# Patient Record
Sex: FEMALE | Race: WHITE | NOT HISPANIC OR LATINO | URBAN - NONMETROPOLITAN AREA
[De-identification: names, ages, dates, MRNs, and addresses within clinical notes are randomized per-mention and may not be internally consistent; named-entity substitution may affect disease eponyms.]

---

## 2017-03-10 ENCOUNTER — OFFICE VISIT (OUTPATIENT)
Dept: FAMILY MEDICINE CLINIC | Facility: CLINIC | Age: 52
End: 2017-03-10

## 2017-03-10 VITALS
DIASTOLIC BLOOD PRESSURE: 80 MMHG | HEIGHT: 62 IN | HEART RATE: 119 BPM | BODY MASS INDEX: 30.18 KG/M2 | SYSTOLIC BLOOD PRESSURE: 134 MMHG | OXYGEN SATURATION: 96 % | WEIGHT: 164 LBS | TEMPERATURE: 98.3 F | RESPIRATION RATE: 20 BRPM

## 2017-03-10 DIAGNOSIS — M79.10 MYALGIA: ICD-10-CM

## 2017-03-10 DIAGNOSIS — J02.8 PHARYNGITIS DUE TO OTHER ORGANISM: Primary | ICD-10-CM

## 2017-03-10 DIAGNOSIS — R05.9 COUGH: ICD-10-CM

## 2017-03-10 PROBLEM — R00.2 HEART PALPITATIONS: Status: ACTIVE | Noted: 2017-03-10

## 2017-03-10 PROBLEM — G43.909 MIGRAINES: Status: ACTIVE | Noted: 2017-03-10

## 2017-03-10 PROBLEM — I10 HYPERTENSION: Status: ACTIVE | Noted: 2017-03-10

## 2017-03-10 PROBLEM — E03.9 HYPOTHYROIDISM: Status: ACTIVE | Noted: 2017-03-10

## 2017-03-10 PROBLEM — K21.9 GASTROESOPHAGEAL REFLUX DISEASE: Status: ACTIVE | Noted: 2017-03-10

## 2017-03-10 PROBLEM — F41.9 ANXIETY: Status: ACTIVE | Noted: 2017-03-10

## 2017-03-10 LAB
EXPIRATION DATE: NORMAL
EXPIRATION DATE: NORMAL
FLUAV AG NPH QL: NORMAL
FLUBV AG NPH QL: NORMAL
INTERNAL CONTROL: NORMAL
INTERNAL CONTROL: NORMAL
Lab: NORMAL
Lab: NORMAL
S PYO AG THROAT QL: NEGATIVE

## 2017-03-10 PROCEDURE — 87804 INFLUENZA ASSAY W/OPTIC: CPT | Performed by: NURSE PRACTITIONER

## 2017-03-10 PROCEDURE — 87880 STREP A ASSAY W/OPTIC: CPT | Performed by: NURSE PRACTITIONER

## 2017-03-10 PROCEDURE — 99213 OFFICE O/P EST LOW 20 MIN: CPT | Performed by: NURSE PRACTITIONER

## 2017-03-10 RX ORDER — PREDNISONE 20 MG/1
20 TABLET ORAL DAILY
Qty: 5 TABLET | Refills: 0 | Status: SHIPPED | OUTPATIENT
Start: 2017-03-10 | End: 2017-03-15

## 2017-03-10 RX ORDER — FLUTICASONE PROPIONATE 50 MCG
1 SPRAY, SUSPENSION (ML) NASAL DAILY
COMMUNITY

## 2017-03-10 RX ORDER — DEXTROMETHORPHAN HYDROBROMIDE AND PROMETHAZINE HYDROCHLORIDE 15; 6.25 MG/5ML; MG/5ML
5 SYRUP ORAL 4 TIMES DAILY PRN
Qty: 120 ML | Refills: 0 | Status: SHIPPED | OUTPATIENT
Start: 2017-03-10

## 2017-03-10 RX ORDER — LEVOTHYROXINE SODIUM 0.05 MG/1
50 TABLET ORAL DAILY
COMMUNITY

## 2017-03-10 NOTE — PROGRESS NOTES
Chief Complaint   Patient presents with   • Sore Throat     skin hurts   • Cough     low grade fever last nite and this morn   • Earache     left ear        Allergies   Allergen Reactions   • Bactrim [Sulfamethoxazole-Trimethoprim]    • Codeine    • Prevacid [Lansoprazole]        History provided by: self     HPI:  Subjective   Radha Sethi is a 51 y.o. female presents today with Complaints of myalgia, low grade fever, cough, sore throat and congestion for 2 days with no improvement with otc meds.  Has had associated low grade fever, left ear pain.  Rates overall 9/10.  Has anxiety stable with alprazolam, GERD stable with famotidine, hypothyroidism stable with levothyroxine, HTN stable with lisinopril and HCTZ, and takes primrose for hot flashes.  Rates overall 9/10     PCP currently listed as Louise Hurtado, DNP, APRN.     Aggravating Factors: laying flat, constant clearing throat  Improving Factors: popsicles  Treatments Tried: ibuprofen  Sick Contacts none    Past Medical History   Diagnosis Date   • Anxiety    • Gastroesophageal reflux disease    • Heart palpitations    • Hypertension    • Hypothyroidism    • Migraines      Past Surgical History   Procedure Laterality Date   • Total laparoscopic hysterectomy       with bilateral salpingectomy, cysto Davinci Assisted   •  section     • Cholecystectomy     • Appendectomy       Social History     Social History   • Marital status:      Spouse name: N/A   • Number of children: N/A   • Years of education: N/A     Social History Main Topics   • Smoking status: Former Smoker     Types: Cigarettes   • Smokeless tobacco: Never Used   • Alcohol use No   • Drug use: None   • Sexual activity: Not Asked     Other Topics Concern   • None     Social History Narrative     Family History   Problem Relation Age of Onset   • Cerebral aneurysm Mother    • Hypertension Father    • Breast cancer Sister    • Breast cancer Maternal Grandmother    • Ovarian cancer  "Paternal Grandmother    • No Known Problems Sister    • No Known Problems Sister    • Breast cancer Maternal Aunt        Current Outpatient Prescriptions on File Prior to Visit   Medication Sig Dispense Refill   • ALPRAZolam (XANAX) 0.5 MG tablet Take 0.5 mg by mouth at night as needed for anxiety.     • famotidine (PEPCID) 20 MG tablet Take 20 mg by mouth 2 (two) times a day.     • hydrochlorothiazide (MICROZIDE) 12.5 MG capsule Take 12.5 mg by mouth every morning.     • lisinopril (PRINIVIL,ZESTRIL) 10 MG tablet Take 10 mg by mouth daily.     • psyllium (METAMUCIL) 58.6 % packet Take 1 packet by mouth daily.       No current facility-administered medications on file prior to visit.       ROS Positives bolded  Constitutional: Negative for appetite change.  Has myalgia, fever  HENT: Negative for ear pain, rhinorrhea and sinus pressure.  sore throat  Respiratory: Negative for chest tightness and shortness of breath. has cough   Cardiology:  Negative for Chest pain, numbness or tingling   Gastrointestinal: Negative for diarrhea.   Genitourinary: Negative for dysuria, flank pain and urgency.   \"All other systems reviewed and negative, except as listed above.”      OBJECTIVE:  General appearance: alert, appears stated age and cooperative    Head: Normocephalic, without obvious abnormality, atraumatic    Eyes: conjunctivae/corneas clear. PERRL, EOM's intact.    Ears: normal TM's and external ear canals both ears    Nose: Nares normal. Septum midline. Mucosa normal. Clear drainage or sinus tenderness.    Throat: abnormal findings: pharyngeal Erythema, no tonsillar exudate.  Tonsils normal.    Neck: mild anterior cervical adenopathy, supple, symmetrical, trachea midline and thyroid not enlarged, symmetric, no tenderness/mass/nodules    Lungs: clear to auscultation bilaterally    Abdomen:  Soft, non tender, non distended. Bowel sounds present all quadrants.  No rebound, no guarding, no hepatosplenomegaly in supine or " "decubitus positions.    Heart: regular rate and rhythm, S1, S2 normal, no murmur, click, rub or gallop    Abdomen: soft, non-tender; bowel sounds normal; no masses,  no organomegaly    Extremities: extremities normal, atraumatic, no cyanosis or edema    Skin: Skin color, texture, turgor normal. No rashes or lesions.  Scarlatiniform Rash: no    Lymph nodes: supraclavicular, and axillary nodes normal. Anterior cervical LN enlarged along zone 2 with tenderness. Nodes are <2cm in size.  Anterior chain without deeper cervical chain involvement.     Neurologic: Alert and oriented X 3, normal strength and tone. Normal coordination and gait. No obvious cranial nerve defects.     Visit Vitals   • /80   • Pulse 119   • Temp 98.3 °F (36.8 °C)   • Resp 20   • Ht 62\" (157.5 cm)   • Wt 164 lb (74.4 kg)   • LMP  (LMP Unknown)   • SpO2 96%   • BMI 30 kg/m2       Assessment/Plan  Radha was seen today for sore throat, cough and earache.    Diagnoses and all orders for this visit:    Pharyngitis due to other organism  -     POC Rapid Strep A  -     predniSONE (DELTASONE) 20 MG tablet; Take 1 tablet by mouth Daily for 5 days.    Myalgia  -     POC Influenza A / B    Cough  -     promethazine-dextromethorphan (PROMETHAZINE-DM) 6.25-15 MG/5ML syrup; Take 5 mL by mouth 4 (Four) Times a Day As Needed for cough.  -     predniSONE (DELTASONE) 20 MG tablet; Take 1 tablet by mouth Daily for 5 days.        Definition  Pharyngitis: Inflammation of the pharynx and surrounding lymph tissue (tonsils)    Ddx: Stomatitis, Rhinitis or sinusitis, post nasal drip, epiglottis, thyroiditis, Strep, URI, mononucleosis, herpangina, Kawasaki disease, rubella, rubeola, peritonsilar abscess    LABS:  POCT Rapid Strep  Negative     POCT Rapid Flu A/B  Negative      Pt Education  1. Call office immediately pt has: dyspnea, drooling, diffiuclty swallowing and inability to       fully open mouth  2. Increase fluid intake  3. Can not return to school or work " until on antibiotic therapy for 24 hours  4. Change toothbrush in 24 hrs or clean with baking soda and peroxide  5. Good handwashing  6. Salt water gargles 3-4 times a day  7. Offered handout to pt regarding dx.   8.  Allergy recommendations discussed  9.  Would change pillowcases and wash with hot/dry hot 2x weekly and change sheets        minimum weekly. Consider anti-allergenic coverings for sheets/pillowcases.   10. Avoid tobacco  11. Keep pets out of room of sleeping as able.    12. Risks/benefits of abx and steroids discussed with patient today.  13. Allergies reviewed.  The patient/family are aware and accept that if there any side         effects they should call or return to clinic as soon as possible.  14. Risks/benefits of current and new medication discussed  15. Injection: Decadron, dexamethasone, methylprednisolone, prednisone. Pt notified of         potential pros/risks of steroid treatment including rapid improvement of condition;         allergic reaction, psychologic reaction (depression, anxiety & insomnia), skin change         at injection site (color, dimpling), muscle weakness, changes in blood sugar,         cataracts/glaucoma, AVN.  This list is not all inclusive and patient is aware they may         refuse treatment.  16.  Appropriate F/U discussed for topics addressed today.  17. All questions were answered to the satisfactory state of patient/family.   18. Should symptoms fail to improve or worsen they agree to call or return to clinic or to         go to the ER.   19. Importance of maintaining follow-up discussed and patient accepts that missed          appointments can delay diagnosis and potentially lead to worsening of conditions.  20.  For Female Patients:  · If taking antibiotics and using birth control at the same time it is important to use a backup method of birth control for 2-4 weeks as antibiotics can decrease efficacy of the birth control.   · Risks of yeast infection and abx  d/w patient.  Can Treat with diflucan if needed. Diflucan can decrease efficacy of OCP.      An After Visit Summary was printed and given to the patient at discharge.       Return in about 1 week (around 3/17/2017).    Louise Hurtado, ROSEY, APRN-BC

## 2017-03-10 NOTE — PATIENT INSTRUCTIONS

## 2021-10-19 ENCOUNTER — TRANSCRIBE ORDERS (OUTPATIENT)
Dept: ADMINISTRATIVE | Facility: HOSPITAL | Age: 56
End: 2021-10-19

## 2021-10-19 DIAGNOSIS — R92.8 ABNORMAL MAMMOGRAM: Primary | ICD-10-CM

## 2021-10-21 ENCOUNTER — HOSPITAL ENCOUNTER (OUTPATIENT)
Dept: MAMMOGRAPHY | Facility: HOSPITAL | Age: 56
Discharge: HOME | End: 2021-10-21

## 2021-10-21 ENCOUNTER — HOSPITAL ENCOUNTER (OUTPATIENT)
Dept: ULTRASOUND IMAGING | Facility: HOSPITAL | Age: 56
Discharge: HOME | End: 2021-10-21

## 2021-10-21 DIAGNOSIS — R92.8 ABNORMAL MAMMOGRAM: ICD-10-CM

## 2021-10-21 PROCEDURE — 88305 TISSUE EXAM BY PATHOLOGIST: CPT | Performed by: SURGERY

## 2021-10-21 PROCEDURE — 88341 IMHCHEM/IMCYTCHM EA ADD ANTB: CPT | Performed by: SURGERY

## 2021-10-21 PROCEDURE — A4648 IMPLANTABLE TISSUE MARKER: HCPCS

## 2021-10-21 PROCEDURE — 76642 ULTRASOUND BREAST LIMITED: CPT

## 2021-10-21 PROCEDURE — 88342 IMHCHEM/IMCYTCHM 1ST ANTB: CPT | Performed by: SURGERY

## 2021-10-21 RX ORDER — LIDOCAINE HYDROCHLORIDE 10 MG/ML
10 INJECTION, SOLUTION INFILTRATION; PERINEURAL ONCE
Status: DISPENSED | OUTPATIENT
Start: 2021-10-21

## 2021-10-21 RX ORDER — LIDOCAINE HYDROCHLORIDE AND EPINEPHRINE 10; 10 MG/ML; UG/ML
10 INJECTION, SOLUTION INFILTRATION; PERINEURAL ONCE
Status: DISPENSED | OUTPATIENT
Start: 2021-10-21

## 2021-11-08 LAB
CYTO UR: NORMAL
LAB AP CASE REPORT: NORMAL
LAB AP CLINICAL INFORMATION: NORMAL
LAB AP DIAGNOSIS COMMENT: NORMAL
PATH REPORT.FINAL DX SPEC: NORMAL
PATH REPORT.GROSS SPEC: NORMAL

## 2022-06-10 NOTE — PROGRESS NOTES
HISTORY OF PRESENT ILLNESS:    Ms. Pascual Morris is a 49 y. o. white  female who presents today to [facility] care. She was diagnosed with Invasive ductal carcinoma, low grade of the left breast October of 2021. ER is positive at 93%. SC is positive at 95%. Her2 is Equivocal. Ki67 is High at 27%. FISH is negative. She had a partial mastectomy 11/18/2021 by Dr Marialuisa Walsh in [place] and is currently taking Arimidex. She has a family history of breast cancer in her Maternal grandmother, maternal aunt and sister. She is post menopausal and is not on HRT. BREAST EXAM:    Maris Head  has unremarkable fibrocystic changes throughout both breasts. There are no dominant masses, no skin or nipple changes and no axillary adenopathy. I see nothing suspicious on physical examination. She has appropriate postoperative scarring on the left but has some diminished range of motion in her left arm        IMPRESSION: Doing well status post left lumpectomy11/18/2021                           diminished range of motion left arm    PLAN: Unilateral Left Mammogram and Ultrasound now. I will see her back in November with bilateral mammograms unless her current mammogram is abnormal.    I instructed her in physical therapy exercises for her left arm and hopefully this will be much improved in 6 months    I have seen, examined and reviewed this patient medication list, appropriate labs and imaging studies. I reviewed relevant medical records and others physicians notes. I discussed the plans of care with the patient. I answered all the questions to the patients satisfaction. I, Dr Lakisha Metcalf, personally performed the services described in this documentation as scribed by Jacinda Villegas MA in my presence and is both accurate and complete.      (Please note that portions of this note were completed with a voice recognition program. Efforts were made to edit the dictations but occasionally words are mis-transcribed.)  Over 50% of the total visit time of 45 minutes in face to face encounter with the patient, out of which more than 50% of the time was spent in counseling patient or family and coordination of care. Counseling included but was not limited to time spent reviewing labs, imaging studies/ treatment plan and answering questions.

## 2022-06-16 ENCOUNTER — OFFICE VISIT (OUTPATIENT)
Dept: SURGERY | Age: 57
End: 2022-06-16
Payer: COMMERCIAL

## 2022-06-16 VITALS
DIASTOLIC BLOOD PRESSURE: 70 MMHG | HEART RATE: 76 BPM | HEIGHT: 62 IN | SYSTOLIC BLOOD PRESSURE: 128 MMHG | BODY MASS INDEX: 31.47 KG/M2 | WEIGHT: 171 LBS

## 2022-06-16 DIAGNOSIS — Z98.890 STATUS POST LEFT BREAST LUMPECTOMY: ICD-10-CM

## 2022-06-16 DIAGNOSIS — C50.812 MALIGNANT NEOPLASM OF OVERLAPPING SITES OF LEFT BREAST IN FEMALE, ESTROGEN RECEPTOR POSITIVE (HCC): ICD-10-CM

## 2022-06-16 DIAGNOSIS — Z17.0 MALIGNANT NEOPLASM OF OVERLAPPING SITES OF LEFT BREAST IN FEMALE, ESTROGEN RECEPTOR POSITIVE (HCC): ICD-10-CM

## 2022-06-16 PROCEDURE — 99204 OFFICE O/P NEW MOD 45 MIN: CPT | Performed by: SURGERY

## 2022-06-16 RX ORDER — FLUTICASONE PROPIONATE 50 MCG
SPRAY, SUSPENSION (ML) NASAL
COMMUNITY
Start: 2022-03-20

## 2022-06-16 RX ORDER — ALPRAZOLAM 0.5 MG/1
TABLET ORAL
COMMUNITY
Start: 2022-03-31

## 2022-06-16 RX ORDER — HYDROCHLOROTHIAZIDE 25 MG/1
TABLET ORAL
COMMUNITY

## 2022-06-16 RX ORDER — LISINOPRIL 20 MG/1
TABLET ORAL
COMMUNITY
Start: 2022-04-25

## 2022-06-16 RX ORDER — ANASTROZOLE 1 MG/1
TABLET ORAL
COMMUNITY
Start: 2022-06-14

## 2022-06-16 RX ORDER — ALBUTEROL SULFATE 90 UG/1
AEROSOL, METERED RESPIRATORY (INHALATION)
COMMUNITY
Start: 2022-06-08

## 2022-06-16 RX ORDER — FAMOTIDINE 20 MG/1
TABLET, FILM COATED ORAL
COMMUNITY
Start: 2022-06-08

## 2022-06-16 RX ORDER — LEVOTHYROXINE SODIUM 50 UG/1
TABLET ORAL
COMMUNITY
Start: 2022-06-08

## 2022-06-16 NOTE — Clinical Note
Unilateral left mammogram now If normal, bilateral mammograms in 6 months If abnormal get her back into see me

## 2022-06-20 PROBLEM — Z98.890 STATUS POST LEFT BREAST LUMPECTOMY: Status: ACTIVE | Noted: 2022-06-20

## 2022-06-20 PROBLEM — C50.812 MALIGNANT NEOPLASM OF OVERLAPPING SITES OF LEFT BREAST IN FEMALE, ESTROGEN RECEPTOR POSITIVE (HCC): Status: ACTIVE | Noted: 2022-06-20

## 2022-06-20 PROBLEM — Z17.0 MALIGNANT NEOPLASM OF OVERLAPPING SITES OF LEFT BREAST IN FEMALE, ESTROGEN RECEPTOR POSITIVE (HCC): Status: ACTIVE | Noted: 2022-06-20

## 2022-06-21 DIAGNOSIS — Z17.0 MALIGNANT NEOPLASM OF OVERLAPPING SITES OF LEFT BREAST IN FEMALE, ESTROGEN RECEPTOR POSITIVE (HCC): Primary | ICD-10-CM

## 2022-06-21 DIAGNOSIS — Z98.890 STATUS POST LEFT BREAST LUMPECTOMY: ICD-10-CM

## 2022-06-21 DIAGNOSIS — C50.812 MALIGNANT NEOPLASM OF OVERLAPPING SITES OF LEFT BREAST IN FEMALE, ESTROGEN RECEPTOR POSITIVE (HCC): Primary | ICD-10-CM

## 2022-06-29 ENCOUNTER — HOSPITAL ENCOUNTER (OUTPATIENT)
Dept: WOMENS IMAGING | Age: 57
Discharge: HOME | End: 2022-06-29
Payer: COMMERCIAL

## 2022-06-29 DIAGNOSIS — Z98.890 STATUS POST LEFT BREAST LUMPECTOMY: ICD-10-CM

## 2022-06-29 DIAGNOSIS — Z17.0 MALIGNANT NEOPLASM OF OVERLAPPING SITES OF LEFT BREAST IN FEMALE, ESTROGEN RECEPTOR POSITIVE (HCC): ICD-10-CM

## 2022-06-29 DIAGNOSIS — C50.812 MALIGNANT NEOPLASM OF OVERLAPPING SITES OF LEFT BREAST IN FEMALE, ESTROGEN RECEPTOR POSITIVE (HCC): ICD-10-CM

## 2022-06-29 PROCEDURE — G0279 TOMOSYNTHESIS, MAMMO: HCPCS

## 2022-07-08 ENCOUNTER — TELEPHONE (OUTPATIENT)
Dept: SURGERY | Age: 57
End: 2022-07-08

## 2022-07-08 NOTE — TELEPHONE ENCOUNTER
Patient called in wanting to speak to [facility] DIANE regarding some questions she had from last visit with Dr. Fernanda Barnett. She would like results of recent imaging, wanted to make sure Dr. Fernanda Barnett had reveiwed her genetic testing results and if he wanted anything in addition to those done and when he would like to see her back. I told her I would let [facility] know and she could talk to Dr. Fernanda Barnett next week.

## 2022-07-11 DIAGNOSIS — Z85.3 PERSONAL HISTORY OF BREAST CANCER: Primary | ICD-10-CM

## 2022-07-11 NOTE — TELEPHONE ENCOUNTER
Called patient per Dr. Elva Morales testing was negative.  We will see her back in November with bilateral mammogram.

## 2022-12-02 NOTE — PROGRESS NOTES
HISTORY OF PRESENT ILLNESS:    Ms. Natalee Lott is a 56 y.o. white  female who presents today for a 5-month breast exam.    She was diagnosed with Invasive ductal carcinoma, low grade of the left breast October of 2021. ER is positive at 93%. UT is positive at 95%. Her2 is Equivocal. Ki67 is High at 27%. FISH is negative. She had a partial mastectomy 11/18/2021 by Dr Harris Hansen in [facility] and is currently taking Arimidex. She has a family history of breast cancer in her Maternal grandmother, maternal aunt and sister. She is post menopausal and is not on HRT. Mammogram-10/17/2022 [facility]  Findings: Surgical scar identified in the left breast secondary to a lumpectomy performed in 2021. No suspicious masses or suspicious calcifications. Recommend: Routine annual mammographic screening. Summary: BI-RADS Category 2--Benign findings. No mammographic evidence of malignancy. Cassidy Mcdonough M.D. I reviewed the radiographic images with the patient. I do not concur with the radiologist evaluation and recommendations. She has a fairly impressive spiculated mass that I do suspect is scar tissue from prior surgery but would like to look at this again in 6 months and make sure it is not changing. BREAST EXAM:    Néstor Cardenas  has unremarkable fibrocystic changes throughout both breasts. There are no dominant masses, no skin or nipple changes and no axillary adenopathy. I see nothing suspicious on physical examination. She has appropriate postoperative scarring on the left but has some diminished range of motion in her left arm        IMPRESSION: Doing well status post left lumpectomy-11/18/2021                           diminished range of motion left arm    PLAN: I will see her back in 6-months for a physical exam. We will get a unilateral left mammogram at that time. I have seen, examined and reviewed this patient medication list, appropriate labs and imaging studies.  I reviewed relevant medical records and others physicians notes. I discussed the plans of care with the patient. I answered all the questions to the patients satisfaction. I, Dr Eligio Gautam, personally performed the services described in this documentation as scribed by Roe Pavon MA in my presence and is both accurate and complete. (Please note that portions of this note were completed with a voice recognition program. Efforts were made to edit the dictations but occasionally words are mis-transcribed.)  Over 50% of the total visit time of 25 minutes in face to face encounter with the patient, out of which more than 50% of the time was spent in counseling patient or family and coordination of care. Counseling included but was not limited to time spent reviewing labs, imaging studies/ treatment plan and answering questions.

## 2022-12-05 ENCOUNTER — OFFICE VISIT (OUTPATIENT)
Dept: SURGERY | Age: 57
End: 2022-12-05
Payer: COMMERCIAL

## 2022-12-05 VITALS — SYSTOLIC BLOOD PRESSURE: 124 MMHG | HEART RATE: 76 BPM | DIASTOLIC BLOOD PRESSURE: 80 MMHG

## 2022-12-05 DIAGNOSIS — C50.812 MALIGNANT NEOPLASM OF OVERLAPPING SITES OF LEFT BREAST IN FEMALE, ESTROGEN RECEPTOR POSITIVE (HCC): Primary | ICD-10-CM

## 2022-12-05 DIAGNOSIS — Z17.0 MALIGNANT NEOPLASM OF OVERLAPPING SITES OF LEFT BREAST IN FEMALE, ESTROGEN RECEPTOR POSITIVE (HCC): Primary | ICD-10-CM

## 2022-12-05 PROCEDURE — 99214 OFFICE O/P EST MOD 30 MIN: CPT | Performed by: SURGERY

## 2022-12-05 RX ORDER — ERGOCALCIFEROL 1.25 MG/1
CAPSULE ORAL
COMMUNITY
Start: 2022-11-18

## 2023-05-12 ENCOUNTER — TELEPHONE (OUTPATIENT)
Dept: SURGERY | Age: 58
End: 2023-05-12

## 2023-05-12 DIAGNOSIS — Z17.0 MALIGNANT NEOPLASM OF OVERLAPPING SITES OF LEFT BREAST IN FEMALE, ESTROGEN RECEPTOR POSITIVE (HCC): Primary | ICD-10-CM

## 2023-05-12 DIAGNOSIS — C50.812 MALIGNANT NEOPLASM OF OVERLAPPING SITES OF LEFT BREAST IN FEMALE, ESTROGEN RECEPTOR POSITIVE (HCC): Primary | ICD-10-CM

## 2023-06-26 ENCOUNTER — OFFICE VISIT (OUTPATIENT)
Dept: SURGERY | Age: 58
End: 2023-06-26
Payer: COMMERCIAL

## 2023-06-26 VITALS — SYSTOLIC BLOOD PRESSURE: 138 MMHG | DIASTOLIC BLOOD PRESSURE: 84 MMHG | HEART RATE: 88 BPM

## 2023-06-26 DIAGNOSIS — Z17.0 MALIGNANT NEOPLASM OF OVERLAPPING SITES OF LEFT BREAST IN FEMALE, ESTROGEN RECEPTOR POSITIVE (HCC): ICD-10-CM

## 2023-06-26 DIAGNOSIS — Z98.890 STATUS POST LEFT BREAST LUMPECTOMY: ICD-10-CM

## 2023-06-26 DIAGNOSIS — C50.812 MALIGNANT NEOPLASM OF OVERLAPPING SITES OF LEFT BREAST IN FEMALE, ESTROGEN RECEPTOR POSITIVE (HCC): Primary | ICD-10-CM

## 2023-06-26 DIAGNOSIS — C50.812 MALIGNANT NEOPLASM OF OVERLAPPING SITES OF LEFT BREAST IN FEMALE, ESTROGEN RECEPTOR POSITIVE (HCC): ICD-10-CM

## 2023-06-26 DIAGNOSIS — Z17.0 MALIGNANT NEOPLASM OF OVERLAPPING SITES OF LEFT BREAST IN FEMALE, ESTROGEN RECEPTOR POSITIVE (HCC): Primary | ICD-10-CM

## 2023-06-26 DIAGNOSIS — Z85.3 PERSONAL HISTORY OF BREAST CANCER: ICD-10-CM

## 2023-06-26 PROCEDURE — 99214 OFFICE O/P EST MOD 30 MIN: CPT | Performed by: SURGERY

## 2023-07-17 ENCOUNTER — OFFICE VISIT (OUTPATIENT)
Dept: SURGERY | Age: 58
End: 2023-07-17
Payer: COMMERCIAL

## 2023-07-17 VITALS
BODY MASS INDEX: 32.79 KG/M2 | HEIGHT: 62 IN | OXYGEN SATURATION: 96 % | HEART RATE: 92 BPM | TEMPERATURE: 98.1 F | WEIGHT: 178.2 LBS

## 2023-07-17 DIAGNOSIS — K64.9 BLEEDING HEMORRHOIDS: Primary | ICD-10-CM

## 2023-07-17 PROCEDURE — 99213 OFFICE O/P EST LOW 20 MIN: CPT | Performed by: SURGERY

## 2023-07-17 NOTE — PROGRESS NOTES
Ms. Katiuska Gupta is a 56year old female who presents with a complaint of rectal bleeding and hemorrhoids. She states that she had a colonoscopy about 4 years ago that showed some internal hemorrhoids. A couple months ago she had an episode or period of time when the anal area was really sore and hurting and was having more bleeding. Since then she has had resolution of those symptoms. Now she is only having some occasional itching and burning. She had some hydrocortisone cream, but really did not use it. She states that she takes benefiber, but does not drink much water. She states that her symptoms are worse when she does not take her benefiber and has some constipation. She also mentions that several years ago she had to have a procedure in the perianal area. She is unsure if it was a fistula or a fissure.     Past Medical History:   Diagnosis Date    Breast cancer ([address])     lt      oct 2021    History of therapeutic radiation      Past Surgical History:   Procedure Laterality Date    APPENDECTOMY      BREAST LUMPECTOMY      GALLBLADDER SURGERY      HYSTERECTOMY, TOTAL ABDOMINAL (CERVIX REMOVED)      MASTECTOMY, PARTIAL Left 11/19/2021    Claudia Ley     BREAST FINE NEEDLE ASPIRATION       Current Outpatient Medications   Medication Sig Dispense Refill    vitamin D (ERGOCALCIFEROL) 1.25 MG (13119 UT) CAPS capsule       lisinopril (PRINIVIL;ZESTRIL) 20 MG tablet lisinopril 20 mg tablet   Take 1/2 (one-half) tablet by mouth once daily      hydroCHLOROthiazide (HYDRODIURIL) 25 MG tablet hydrochlorothiazide 25 mg tablet      famotidine (PEPCID) 20 MG tablet       EUTHYROX 50 MCG tablet       anastrozole (ARIMIDEX) 1 MG tablet       ALPRAZolam (XANAX) 0.5 MG tablet alprazolam 0.5 mg tablet   TAKE 1 TABLET BY MOUTH THREE TIMES DAILY AS NEEDED FOR ANXIETY      albuterol sulfate HFA (PROVENTIL;VENTOLIN;PROAIR) 108 (90 Base) MCG/ACT inhaler       fluticasone (FLONASE) 50 MCG/ACT nasal spray USE 2 SPRAY(S) IN EACH NOSTRIL ONCE Quality 226: Preventive Care And Screening: Tobacco Use: Screening And Cessation Intervention: Patient screened for tobacco use and is an ex/non-smoker Detail Level: Detailed Quality 130: Documentation Of Current Medications In The Medical Record: Current Medications Documented

## 2023-07-18 ASSESSMENT — ENCOUNTER SYMPTOMS
CONSTIPATION: 1
ABDOMINAL PAIN: 0
ABDOMINAL DISTENTION: 0
SHORTNESS OF BREATH: 0
BACK PAIN: 0
EYE PAIN: 0
ANAL BLEEDING: 1
EYE REDNESS: 0
COUGH: 0

## 2023-12-15 NOTE — PROGRESS NOTES
status post left lumpectomy-11/18/2021                             PLAN: Follow-up in 6 months with bilateral mammograms  There are some issues with medical oncology in [place] and she is considering referral to Dr. Cheung.    I have seen, examined and reviewed this patient medication list, appropriate labs and imaging studies. I reviewed relevant medical records and others physician’s notes. I discussed the plans of care with the patient. I answered all the questions to the patient’s satisfaction.  I, Dr Fei Lazo, personally performed the services described in this documentation as scribed by Cassandra Patton MA in my presence and is both accurate and complete.     (Please note that portions of this note were completed with a voice recognition program. Efforts were made to edit the dictations but occasionally words are mis-transcribed.)  Over 50% of the total visit time of 25 minutes in face to face encounter with the patient, out of which more than 50% of the time was spent in counseling patient or family and coordination of care. Counseling included but was not limited to time spent reviewing labs, imaging studies/ treatment plan and answering questions.

## 2023-12-28 ENCOUNTER — OFFICE VISIT (OUTPATIENT)
Dept: SURGERY | Age: 58
End: 2023-12-28
Payer: COMMERCIAL

## 2023-12-28 VITALS — BODY MASS INDEX: 32.76 KG/M2 | HEIGHT: 62 IN | HEART RATE: 80 BPM | WEIGHT: 178 LBS

## 2023-12-28 DIAGNOSIS — Z17.0 MALIGNANT NEOPLASM OF OVERLAPPING SITES OF LEFT BREAST IN FEMALE, ESTROGEN RECEPTOR POSITIVE (HCC): ICD-10-CM

## 2023-12-28 DIAGNOSIS — C50.812 MALIGNANT NEOPLASM OF OVERLAPPING SITES OF LEFT BREAST IN FEMALE, ESTROGEN RECEPTOR POSITIVE (HCC): ICD-10-CM

## 2023-12-28 DIAGNOSIS — Z98.890 STATUS POST LEFT BREAST LUMPECTOMY: Primary | ICD-10-CM

## 2023-12-28 PROCEDURE — 99213 OFFICE O/P EST LOW 20 MIN: CPT | Performed by: SURGERY

## 2023-12-28 RX ORDER — DENOSUMAB 60 MG/ML
INJECTION SUBCUTANEOUS ONCE
COMMUNITY

## 2024-01-05 DIAGNOSIS — Z85.3 PERSONAL HISTORY OF BREAST CANCER: Primary | ICD-10-CM

## 2024-07-01 NOTE — PROGRESS NOTES
changes and no axillary adenopathy.  I see nothing suspicious on physical examination.  Her range of motion in the left arm has improved dramatically    IMPRESSION: Doing well status post left lumpectomy-11/18/2021                             PLAN: Appointment Jonatan at convenience  Appointment with Gloria at [facility] to discuss vaginal estrogens  Follow-up with me in 6 months for physical exam    I have seen, examined and reviewed this patient medication list, appropriate labs and imaging studies. I reviewed relevant medical records and others physician’s notes. I discussed the plans of care with the patient. I answered all the questions to the patient’s satisfaction.  I, Dr Fei Lazo, personally performed the services described in this documentation as scribed by Cassandra Patton MA in my presence and is both accurate and complete.     (Please note that portions of this note were completed with a voice recognition program. Efforts were made to edit the dictations but occasionally words are mis-transcribed.)  Over 50% of the total visit time of 35 minutes in face to face encounter with the patient, out of which more than 50% of the time was spent in counseling patient or family and coordination of care. Counseling included but was not limited to time spent reviewing labs, imaging studies/ treatment plan and answering questions.

## 2024-07-03 ENCOUNTER — OFFICE VISIT (OUTPATIENT)
Dept: SURGERY | Age: 59
End: 2024-07-03
Payer: COMMERCIAL

## 2024-07-03 VITALS — HEART RATE: 98 BPM | OXYGEN SATURATION: 98 % | BODY MASS INDEX: 32.94 KG/M2 | HEIGHT: 62 IN | WEIGHT: 179 LBS

## 2024-07-03 DIAGNOSIS — C50.812 MALIGNANT NEOPLASM OF OVERLAPPING SITES OF LEFT BREAST IN FEMALE, ESTROGEN RECEPTOR POSITIVE (HCC): ICD-10-CM

## 2024-07-03 DIAGNOSIS — Z17.0 MALIGNANT NEOPLASM OF OVERLAPPING SITES OF LEFT BREAST IN FEMALE, ESTROGEN RECEPTOR POSITIVE (HCC): ICD-10-CM

## 2024-07-03 DIAGNOSIS — Z98.890 STATUS POST LEFT BREAST LUMPECTOMY: Primary | ICD-10-CM

## 2024-07-03 PROCEDURE — 99214 OFFICE O/P EST MOD 30 MIN: CPT | Performed by: SURGERY

## 2024-07-09 DIAGNOSIS — Z76.89 ENCOUNTER TO ESTABLISH CARE: Primary | ICD-10-CM

## 2024-07-09 DIAGNOSIS — Z85.3 PERSONAL HISTORY OF BREAST CANCER: Primary | ICD-10-CM

## 2024-07-09 DIAGNOSIS — Z85.3 PERSONAL HISTORY OF BREAST CANCER: ICD-10-CM

## 2024-07-12 ENCOUNTER — TELEPHONE (OUTPATIENT)
Dept: OBGYN CLINIC | Age: 59
End: 2024-07-12

## 2024-07-15 ENCOUNTER — TELEPHONE (OUTPATIENT)
Dept: HEMATOLOGY | Age: 59
End: 2024-07-15

## 2024-07-17 ENCOUNTER — TELEPHONE (OUTPATIENT)
Dept: HEMATOLOGY | Age: 59
End: 2024-07-17

## 2024-07-17 VITALS — BODY MASS INDEX: 32.74 KG/M2 | HEIGHT: 62 IN

## 2024-07-17 NOTE — TELEPHONE ENCOUNTER
Pt originally scheduled for new patient appt on 7/17/24 with Dr. Cheung.  After arrival, pt decided she does not want to be seen until after the new year when she has new insurance coverage. Appt pushed back to 1/15/25 at 12:30pm.  Appt scheduled with Dr. Cheung.

## 2024-08-09 ENCOUNTER — OFFICE VISIT (OUTPATIENT)
Dept: OBSTETRICS AND GYNECOLOGY | Age: 59
End: 2024-08-09
Payer: COMMERCIAL

## 2024-08-09 VITALS
SYSTOLIC BLOOD PRESSURE: 134 MMHG | WEIGHT: 184 LBS | BODY MASS INDEX: 33.86 KG/M2 | HEIGHT: 62 IN | DIASTOLIC BLOOD PRESSURE: 92 MMHG

## 2024-08-09 DIAGNOSIS — Z01.419 WELL WOMAN EXAM WITH ROUTINE GYNECOLOGICAL EXAM: Primary | ICD-10-CM

## 2024-08-09 DIAGNOSIS — R10.2 PELVIC PAIN: ICD-10-CM

## 2024-08-09 DIAGNOSIS — Z85.3 HISTORY OF BREAST CANCER: ICD-10-CM

## 2024-08-09 DIAGNOSIS — M85.80 OSTEOPENIA, UNSPECIFIED LOCATION: ICD-10-CM

## 2024-08-09 RX ORDER — GUAIFENESIN 600 MG/1
1 TABLET, EXTENDED RELEASE ORAL EVERY 12 HOURS SCHEDULED
COMMUNITY
Start: 2024-04-16

## 2024-08-10 NOTE — PROGRESS NOTES
"Chief Complaint   Patient presents with    Gynecologic Exam     Patient is here for annual, hysterectomy, hx of breast cancer, mammogram 07/2024, colonoscopy 2020, patient reports some right sided pelvic pain. Patient still has ovaries.         Subjective     Radha Sethi is a 58 y.o. female    History of Present Illness  Pt comes in today for annual wellness. Only complaint is that of off and on pelvic discomfort.     /92 (BP Location: Left arm, Patient Position: Sitting, Cuff Size: Adult)   Ht 157.5 cm (62\")   Wt 83.5 kg (184 lb)   LMP  (LMP Unknown)   BMI 33.65 kg/m²     Outpatient Encounter Medications as of 8/9/2024   Medication Sig Dispense Refill    albuterol sulfate  (90 Base) MCG/ACT inhaler albuterol sulfate HFA 90 mcg/actuation aerosol inhaler      ALPRAZolam (XANAX) 0.5 MG tablet Take 1 tablet by mouth At Night As Needed for Anxiety.      anastrozole (ARIMIDEX) 1 MG tablet Take 1 tablet by mouth Daily.      denosumab (Prolia) 60 MG/ML solution prefilled syringe syringe Inject  under the skin into the appropriate area as directed 1 (One) Time.      famotidine (PEPCID) 20 MG tablet Take 1 tablet by mouth 2 (Two) Times a Day.      fluticasone (FLONASE) 50 MCG/ACT nasal spray 1 spray into the nostril(s) as directed by provider Daily. PRN      hydrochlorothiazide (MICROZIDE) 12.5 MG capsule Take 1 capsule by mouth Every Morning.      hydrocortisone 2.5 % cream APPLY A THIN LAYER OF CREAM TO AFFECTED AREAS TOPICALLY TWICE DAILY TO EXTERNAL HEMORRHOIDS      levothyroxine (SYNTHROID, LEVOTHROID) 50 MCG tablet Take 1 tablet by mouth Daily.      lisinopril (PRINIVIL,ZESTRIL) 10 MG tablet Take 1 tablet by mouth Daily.      Mometasone Furoate (Asmanex, 60 Metered Doses,) 220 MCG/ACT inhaler Asmanex Twisthaler 220 mcg/actuation(60 doses) breath activated inhalr   INHALE 2 PUFFS BY MOUTH ONCE DAILY      Mucus Relief 600 MG 12 hr tablet Take 1 tablet by mouth Every 12 (Twelve) Hours.      vitamin D " (ERGOCALCIFEROL) 1.25 MG (01880 UT) capsule capsule Take 1 capsule by mouth 1 (One) Time Per Week.      [DISCONTINUED] ALPRAZolam (XANAX) 0.5 MG tablet alprazolam   tab 0.5mgalprazolam      [DISCONTINUED] famotidine (PEPCID) 20 MG tablet famotidine 20 mg tabs      [DISCONTINUED] psyllium (METAMUCIL) 58.6 % packet Take 1 packet by mouth Daily.       Facility-Administered Encounter Medications as of 2024   Medication Dose Route Frequency Provider Last Rate Last Admin    lidocaine (XYLOCAINE) 1 % injection 10 mL  10 mL Subcutaneous Once Darrian Parmar MD        lidocaine 1% - EPINEPHrine 1:668192 (XYLOCAINE W/EPI) 1 %-1:183598 injection 10 mL  10 mL Injection Once Darrian Parmar MD           Past Medical History  Past Medical History:   Diagnosis Date    Gastroesophageal reflux disease     History of left breast cancer         Surgical History  Past Surgical History:   Procedure Laterality Date    APPENDECTOMY       SECTION      CHOLECYSTECTOMY      MASTECTOMY, PARTIAL Left     TOTAL LAPAROSCOPIC HYSTERECTOMY      with bilateral salpingectomy, cysto Davinci Assisted       Family History  Family History   Problem Relation Age of Onset    Cerebral aneurysm Mother     Hypertension Father     Breast cancer Sister     Breast cancer Maternal Grandmother     Ovarian cancer Paternal Grandmother     No Known Problems Sister     No Known Problems Sister     Breast cancer Maternal Aunt        The following portions of the patient's history were reviewed and updated as appropriate: allergies, current medications, past family history, past medical history, past social history, past surgical history, and problem list.    Review of Systems   Constitutional:  Negative for activity change and unexpected weight loss.   Cardiovascular:  Negative for chest pain.   Gastrointestinal:  Negative for blood in stool, constipation and diarrhea.   Endocrine: Negative for cold intolerance and heat intolerance.    Genitourinary:  Positive for pelvic pain. Negative for dyspareunia and vaginal discharge.   Musculoskeletal:  Negative for arthralgias, back pain, neck pain and neck stiffness.   Skin:  Negative for rash.   Neurological:  Negative for dizziness and headache.   Psychiatric/Behavioral:  Negative for sleep disturbance. The patient is not nervous/anxious.        Objective   Physical Exam  Vitals and nursing note reviewed. Exam conducted with a chaperone present.   Constitutional:       Appearance: She is well-developed.   HENT:      Head: Normocephalic and atraumatic.      Right Ear: External ear normal.      Left Ear: External ear normal.   Eyes:      General: No scleral icterus.        Right eye: No discharge.         Left eye: No discharge.      Conjunctiva/sclera: Conjunctivae normal.   Neck:      Thyroid: No thyromegaly.      Vascular: No carotid bruit.   Cardiovascular:      Rate and Rhythm: Regular rhythm.      Heart sounds: Normal heart sounds. No murmur heard.  Pulmonary:      Effort: Pulmonary effort is normal. No respiratory distress.      Breath sounds: Normal breath sounds.   Chest:   Breasts:     Breasts are symmetrical.      Right: No inverted nipple, mass, nipple discharge, skin change or tenderness.      Left: No inverted nipple, mass, nipple discharge, skin change or tenderness.   Abdominal:      General: Bowel sounds are normal. There is no distension.      Palpations: Abdomen is soft. There is no mass.      Tenderness: There is no abdominal tenderness. There is no guarding.      Hernia: No hernia is present. There is no hernia in the left inguinal area.   Genitourinary:     Labia:         Right: No rash, tenderness, lesion or injury.         Left: No rash, tenderness, lesion or injury.       Vagina: Normal. No signs of injury. No vaginal discharge, erythema or bleeding.      Adnexa: Right adnexa normal and left adnexa normal.      Rectum: No mass.      Comments: Cervix, Uterus are surgically  absent.  Urethra and urethral meatus normal  Bladder, normal no prolapse  Perineum and anus examined and without lesions  Musculoskeletal:         General: No tenderness. Normal range of motion.      Cervical back: Normal range of motion and neck supple.   Lymphadenopathy:      Head:      Right side of head: No submental, submandibular, tonsillar, preauricular, posterior auricular or occipital adenopathy.      Left side of head: No submental, submandibular, tonsillar, preauricular, posterior auricular or occipital adenopathy.      Cervical: No cervical adenopathy.      Right cervical: No superficial, deep or posterior cervical adenopathy.     Left cervical: No superficial, deep or posterior cervical adenopathy.   Skin:     General: Skin is warm and dry.      Findings: No bruising, erythema or rash.   Neurological:      Mental Status: She is alert and oriented to person, place, and time.      Motor: No abnormal muscle tone.      Coordination: Coordination normal.   Psychiatric:         Behavior: Behavior normal.         Thought Content: Thought content normal.         Judgment: Judgment normal.         Assessment & Plan   Diagnoses and all orders for this visit:    1. Well woman exam with routine gynecological exam (Primary)    2. Osteopenia, unspecified location    3. Pelvic pain    4. History of breast cancer         Normal GYN exam. Encouraged SBE, pt is aware how to do self breast exam and the importance of same. Discussed weight management and importance of maintaining a healthy weight. Discussed Vitamin D intake and the importance of adequate vitamin D for both bone health and a healthy immune system.  Discussed daily exercise and the importance of same, in regards to a healthy heart as well as helping to maintain her weight and improving her mental health.  Colonoscopy is up to date. Mammogram is up to date. Bone density is up to date. Pap smear is not done per ASCCP guidelines. HPV is not done. Lab work up is  up to date through PCP.    Pt has off and on pelvic pain. Has history of breast cancer. Has history of ovarian cysts. Will return for ultrasound.    Pt follows with oncologist for osteopenia treatment and they do Bone density.    Pt follows with general surgeon for mammograms due to history of breast cancer.    Pt denies history of abnormal pap.      BMI is >= 30 and <35. (Class 1 Obesity). The following options were offered after discussion;: weight loss educational material (shared in after visit summary)      Selene Gomez, APRN  8/10/2024    Return for ultrasound and OV for pelvic pain.

## 2024-08-27 ENCOUNTER — OFFICE VISIT (OUTPATIENT)
Dept: OBSTETRICS AND GYNECOLOGY | Age: 59
End: 2024-08-27
Payer: COMMERCIAL

## 2024-08-27 VITALS
SYSTOLIC BLOOD PRESSURE: 142 MMHG | WEIGHT: 179 LBS | DIASTOLIC BLOOD PRESSURE: 96 MMHG | HEIGHT: 62 IN | BODY MASS INDEX: 32.94 KG/M2

## 2024-08-27 DIAGNOSIS — R10.2 PELVIC PAIN: Primary | ICD-10-CM

## 2024-08-27 PROCEDURE — 99213 OFFICE O/P EST LOW 20 MIN: CPT | Performed by: NURSE PRACTITIONER

## 2024-08-27 PROCEDURE — 99459 PELVIC EXAMINATION: CPT | Performed by: NURSE PRACTITIONER

## 2024-08-27 NOTE — PROGRESS NOTES
Chief Complaint   Patient presents with    Follow-up     Patient here following up on pelvic pain. Patient had u/s in office.          History:  Radha Sethi is a 59 y.o. female who presents today for follow-up for evaluation of the above:  Pt comes in today to follow up on off and on pelvic pain.         ROS:  Review of Systems   Constitutional:  Negative for activity change and unexpected weight loss.   Cardiovascular:  Negative for chest pain.   Gastrointestinal:  Negative for blood in stool, constipation and diarrhea.   Endocrine: Negative for cold intolerance and heat intolerance.   Genitourinary:  Positive for pelvic pain. Negative for dyspareunia and vaginal discharge.   Musculoskeletal:  Negative for arthralgias, back pain, neck pain and neck stiffness.   Skin:  Negative for rash.   Neurological:  Negative for dizziness and headache.   Psychiatric/Behavioral:  Negative for sleep disturbance. The patient is not nervous/anxious.        Ms. Sethi  reports that she has quit smoking. Her smoking use included cigarettes. She has never used smokeless tobacco. She reports that she does not drink alcohol and does not use drugs.      Current Outpatient Medications:     albuterol sulfate  (90 Base) MCG/ACT inhaler, albuterol sulfate HFA 90 mcg/actuation aerosol inhaler, Disp: , Rfl:     ALPRAZolam (XANAX) 0.5 MG tablet, Take 1 tablet by mouth At Night As Needed for Anxiety., Disp: , Rfl:     anastrozole (ARIMIDEX) 1 MG tablet, Take 1 tablet by mouth Daily., Disp: , Rfl:     denosumab (Prolia) 60 MG/ML solution prefilled syringe syringe, Inject  under the skin into the appropriate area as directed 1 (One) Time., Disp: , Rfl:     famotidine (PEPCID) 20 MG tablet, Take 1 tablet by mouth 2 (Two) Times a Day., Disp: , Rfl:     fluticasone (FLONASE) 50 MCG/ACT nasal spray, 1 spray into the nostril(s) as directed by provider Daily. PRN, Disp: , Rfl:     hydrochlorothiazide (MICROZIDE) 12.5 MG capsule, Take 1  "capsule by mouth Every Morning., Disp: , Rfl:     hydrocortisone 2.5 % cream, APPLY A THIN LAYER OF CREAM TO AFFECTED AREAS TOPICALLY TWICE DAILY TO EXTERNAL HEMORRHOIDS, Disp: , Rfl:     levothyroxine (SYNTHROID, LEVOTHROID) 50 MCG tablet, Take 1 tablet by mouth Daily., Disp: , Rfl:     lisinopril (PRINIVIL,ZESTRIL) 10 MG tablet, Take 1 tablet by mouth Daily., Disp: , Rfl:     Mometasone Furoate (Asmanex, 60 Metered Doses,) 220 MCG/ACT inhaler, Asmanex Twisthaler 220 mcg/actuation(60 doses) breath activated inhalr  INHALE 2 PUFFS BY MOUTH ONCE DAILY, Disp: , Rfl:     Mucus Relief 600 MG 12 hr tablet, Take 1 tablet by mouth Every 12 (Twelve) Hours., Disp: , Rfl:     vitamin D (ERGOCALCIFEROL) 1.25 MG (71925 UT) capsule capsule, Take 1 capsule by mouth 1 (One) Time Per Week., Disp: , Rfl:     Current Facility-Administered Medications:     lidocaine (XYLOCAINE) 1 % injection 10 mL, 10 mL, Subcutaneous, Once, Darrian Parmar MD    lidocaine 1% - EPINEPHrine 1:114788 (XYLOCAINE W/EPI) 1 %-1:840005 injection 10 mL, 10 mL, Injection, Once, Darrian Parmar MD      OBJECTIVE:  /96 (BP Location: Left arm, Patient Position: Sitting, Cuff Size: Adult)   Ht 157.5 cm (62\")   Wt 81.2 kg (179 lb)   LMP  (LMP Unknown)   BMI 32.74 kg/m²    Physical Exam  Vitals and nursing note reviewed.   Constitutional:       Appearance: She is well-developed.   HENT:      Head: Normocephalic and atraumatic.   Eyes:      General:         Right eye: No discharge.         Left eye: No discharge.      Conjunctiva/sclera: Conjunctivae normal.   Neck:      Thyroid: No thyromegaly.   Cardiovascular:      Rate and Rhythm: Normal rate and regular rhythm.      Heart sounds: Normal heart sounds. No murmur heard.  Pulmonary:      Effort: Pulmonary effort is normal.      Breath sounds: Normal breath sounds.   Musculoskeletal:      Cervical back: Normal range of motion and neck supple.   Skin:     General: Skin is warm and dry.   Neurological:      " Mental Status: She is alert and oriented to person, place, and time.   Psychiatric:         Mood and Affect: Mood normal.         Behavior: Behavior normal.         Thought Content: Thought content normal.         Judgment: Judgment normal.         Assessment/Plan    Diagnoses and all orders for this visit:    1. Pelvic pain (Primary)    Pt comes in today to follow up on off and on pelvic pain.  Ultrasound today shows normal right ovary. Unable to visualize left ovary. No abnormalities.   Pt feels it is likely related to her hip or constipation.  Will return with continuing or worsening symptoms.        An After Visit Summary was printed and given to the patient at discharge.  Return in about 1 year (around 8/27/2025) for Annual physical. Sooner if problems arise.          CONTRERAS Hogue  Electronically Signed

## 2024-09-23 ENCOUNTER — HOSPITAL ENCOUNTER (EMERGENCY)
Facility: HOSPITAL | Age: 59
Discharge: HOME | End: 2024-09-23
Attending: FAMILY MEDICINE | Admitting: FAMILY MEDICINE
Payer: COMMERCIAL

## 2024-09-23 ENCOUNTER — APPOINTMENT (OUTPATIENT)
Dept: GENERAL RADIOLOGY | Facility: HOSPITAL | Age: 59
End: 2024-09-23
Payer: COMMERCIAL

## 2024-09-23 VITALS
HEIGHT: 62 IN | DIASTOLIC BLOOD PRESSURE: 99 MMHG | WEIGHT: 179 LBS | SYSTOLIC BLOOD PRESSURE: 150 MMHG | HEART RATE: 105 BPM | OXYGEN SATURATION: 96 % | BODY MASS INDEX: 32.94 KG/M2 | TEMPERATURE: 98.4 F | RESPIRATION RATE: 20 BRPM

## 2024-09-23 DIAGNOSIS — F41.9 ANXIETY: Primary | ICD-10-CM

## 2024-09-23 DIAGNOSIS — I10 CHRONIC HYPERTENSION: ICD-10-CM

## 2024-09-23 LAB
ALBUMIN SERPL-MCNC: 4.8 G/DL (ref 3.5–5.2)
ALBUMIN/GLOB SERPL: 1.5 G/DL
ALP SERPL-CCNC: 130 U/L (ref 39–117)
ALT SERPL W P-5'-P-CCNC: 61 U/L (ref 1–33)
ANION GAP SERPL CALCULATED.3IONS-SCNC: 12 MMOL/L (ref 5–15)
AST SERPL-CCNC: 31 U/L (ref 1–32)
BASOPHILS # BLD AUTO: 0.06 10*3/MM3 (ref 0–0.2)
BASOPHILS NFR BLD AUTO: 0.5 % (ref 0–1.5)
BILIRUB SERPL-MCNC: 0.6 MG/DL (ref 0–1.2)
BUN SERPL-MCNC: 10 MG/DL (ref 6–20)
BUN/CREAT SERPL: 17.5 (ref 7–25)
CALCIUM SPEC-SCNC: 9.9 MG/DL (ref 8.6–10.5)
CHLORIDE SERPL-SCNC: 97 MMOL/L (ref 98–107)
CO2 SERPL-SCNC: 27 MMOL/L (ref 22–29)
CREAT SERPL-MCNC: 0.57 MG/DL (ref 0.57–1)
DEPRECATED RDW RBC AUTO: 40 FL (ref 37–54)
EGFRCR SERPLBLD CKD-EPI 2021: 106.1 ML/MIN/1.73
EOSINOPHIL # BLD AUTO: 0.13 10*3/MM3 (ref 0–0.4)
EOSINOPHIL NFR BLD AUTO: 1.1 % (ref 0.3–6.2)
ERYTHROCYTE [DISTWIDTH] IN BLOOD BY AUTOMATED COUNT: 12.2 % (ref 12.3–15.4)
GLOBULIN UR ELPH-MCNC: 3.1 GM/DL
GLUCOSE SERPL-MCNC: 101 MG/DL (ref 65–99)
HCT VFR BLD AUTO: 44.9 % (ref 34–46.6)
HGB BLD-MCNC: 15.8 G/DL (ref 12–15.9)
HOLD SPECIMEN: NORMAL
HOLD SPECIMEN: NORMAL
IMM GRANULOCYTES # BLD AUTO: 0.05 10*3/MM3 (ref 0–0.05)
IMM GRANULOCYTES NFR BLD AUTO: 0.4 % (ref 0–0.5)
LYMPHOCYTES # BLD AUTO: 2.41 10*3/MM3 (ref 0.7–3.1)
LYMPHOCYTES NFR BLD AUTO: 21 % (ref 19.6–45.3)
MAGNESIUM SERPL-MCNC: 2.1 MG/DL (ref 1.6–2.6)
MCH RBC QN AUTO: 31.7 PG (ref 26.6–33)
MCHC RBC AUTO-ENTMCNC: 35.2 G/DL (ref 31.5–35.7)
MCV RBC AUTO: 90 FL (ref 79–97)
MONOCYTES # BLD AUTO: 0.66 10*3/MM3 (ref 0.1–0.9)
MONOCYTES NFR BLD AUTO: 5.8 % (ref 5–12)
NEUTROPHILS NFR BLD AUTO: 71.2 % (ref 42.7–76)
NEUTROPHILS NFR BLD AUTO: 8.15 10*3/MM3 (ref 1.7–7)
NRBC BLD AUTO-RTO: 0 /100 WBC (ref 0–0.2)
PLATELET # BLD AUTO: 325 10*3/MM3 (ref 140–450)
PMV BLD AUTO: 8.6 FL (ref 6–12)
POTASSIUM SERPL-SCNC: 3.6 MMOL/L (ref 3.5–5.2)
PROT SERPL-MCNC: 7.9 G/DL (ref 6–8.5)
RBC # BLD AUTO: 4.99 10*6/MM3 (ref 3.77–5.28)
SODIUM SERPL-SCNC: 136 MMOL/L (ref 136–145)
TROPONIN T SERPL HS-MCNC: 7 NG/L
TSH SERPL DL<=0.05 MIU/L-ACNC: 2.68 UIU/ML (ref 0.27–4.2)
WBC NRBC COR # BLD AUTO: 11.46 10*3/MM3 (ref 3.4–10.8)
WHOLE BLOOD HOLD COAG: NORMAL
WHOLE BLOOD HOLD SPECIMEN: NORMAL

## 2024-09-23 PROCEDURE — 93010 ELECTROCARDIOGRAM REPORT: CPT | Performed by: INTERNAL MEDICINE

## 2024-09-23 PROCEDURE — 99284 EMERGENCY DEPT VISIT MOD MDM: CPT

## 2024-09-23 PROCEDURE — 84484 ASSAY OF TROPONIN QUANT: CPT | Performed by: FAMILY MEDICINE

## 2024-09-23 PROCEDURE — 80050 GENERAL HEALTH PANEL: CPT | Performed by: FAMILY MEDICINE

## 2024-09-23 PROCEDURE — 93005 ELECTROCARDIOGRAM TRACING: CPT | Performed by: FAMILY MEDICINE

## 2024-09-23 PROCEDURE — 83735 ASSAY OF MAGNESIUM: CPT | Performed by: FAMILY MEDICINE

## 2024-09-23 PROCEDURE — 71045 X-RAY EXAM CHEST 1 VIEW: CPT

## 2024-09-23 RX ORDER — HYDROXYZINE HYDROCHLORIDE 25 MG/1
50 TABLET, FILM COATED ORAL EVERY 6 HOURS PRN
Qty: 60 TABLET | Refills: 0 | Status: SHIPPED | OUTPATIENT
Start: 2024-09-23

## 2024-09-23 RX ORDER — HYDROXYZINE HYDROCHLORIDE 25 MG/1
50 TABLET, FILM COATED ORAL ONCE
Status: COMPLETED | OUTPATIENT
Start: 2024-09-23 | End: 2024-09-23

## 2024-09-23 RX ORDER — SODIUM CHLORIDE 0.9 % (FLUSH) 0.9 %
10 SYRINGE (ML) INJECTION AS NEEDED
Status: DISCONTINUED | OUTPATIENT
Start: 2024-09-23 | End: 2024-09-23 | Stop reason: HOSPADM

## 2024-09-23 RX ADMIN — HYDROXYZINE HYDROCHLORIDE 50 MG: 25 TABLET ORAL at 17:56

## 2024-09-24 LAB
QT INTERVAL: 346 MS
QTC INTERVAL: 446 MS

## 2025-01-07 NOTE — PROGRESS NOTES
HISTORY OF PRESENT ILLNESS:    Ms. Puentes is a 59 y.o. white  female who presents today for a 6-month breast exam following a partial mastectomy on 11/18/2021 by Dr oMreno in [place] and is currently taking Arimidex.     She was diagnosed with Invasive ductal carcinoma, low grade of the left breast October of 2021. ER is positive at 93%. FL is positive at 95%. Her2 is Equivocal. Ki67 is High at 27%. FISH is negative.    She has a family history of breast cancer in her Maternal grandmother, maternal aunt and sister.     She is post menopausal and is not on HRT.      I think she would benefit from some vaginal estrogens based on her symptoms and we will send her to [facility] for evaluation.    She also wishes to see Dr. Cheung in [place] for follow-up of her breast cancer.  There have been locum tenens oncologists in [place] and she would prefer to see someone stable.    Mammogram-6/27/2024 ([facility])  FINDINGS:   Re-identified surgical change in the upper LEFT breast near 12:00 p.m., posterior depth. There are a few scattered benign-appearing calcifications. No new masses or new areas of suspicious architectural distortion. No suspicious calcifications are seen. No significant interval change.     IMPRESSION AND RECOMMENDATION:  No mammographic evidence of malignancy. Stable treatment related changes in the far posterior upper LEFT breast. Recommendation is for the patient to return for routine mammography in 1 year or sooner, if clinically indicated.     BI-RADS category 2-Benign findings. The patient's information has been added to a reminder system with a target due date for the next mammogram.   Jerome Santana MD      She is having ongoing issues with hot flashes and we discussed several management strategies with this.  We will discuss this further at her next visit.    BREAST EXAM:    Gege  has unremarkable fibrocystic changes throughout both breasts. There are no dominant masses, no skin or nipple

## 2025-01-08 ENCOUNTER — OFFICE VISIT (OUTPATIENT)
Dept: SURGERY | Age: 60
End: 2025-01-08

## 2025-01-08 VITALS — HEART RATE: 90 BPM | BODY MASS INDEX: 33.68 KG/M2 | HEIGHT: 62 IN | WEIGHT: 183 LBS

## 2025-01-08 DIAGNOSIS — C50.812 MALIGNANT NEOPLASM OF OVERLAPPING SITES OF LEFT BREAST IN FEMALE, ESTROGEN RECEPTOR POSITIVE (HCC): ICD-10-CM

## 2025-01-08 DIAGNOSIS — Z98.890 STATUS POST LEFT BREAST LUMPECTOMY: Primary | ICD-10-CM

## 2025-01-08 DIAGNOSIS — Z17.0 MALIGNANT NEOPLASM OF OVERLAPPING SITES OF LEFT BREAST IN FEMALE, ESTROGEN RECEPTOR POSITIVE (HCC): ICD-10-CM

## 2025-01-08 RX ORDER — METOPROLOL SUCCINATE 25 MG/1
25 TABLET, EXTENDED RELEASE ORAL 2 TIMES DAILY
COMMUNITY
Start: 2024-12-22

## 2025-01-13 ENCOUNTER — TELEPHONE (OUTPATIENT)
Dept: HEMATOLOGY | Age: 60
End: 2025-01-13

## 2025-01-15 ENCOUNTER — OFFICE VISIT (OUTPATIENT)
Dept: HEMATOLOGY | Age: 60
End: 2025-01-15

## 2025-01-15 ENCOUNTER — HOSPITAL ENCOUNTER (OUTPATIENT)
Dept: INFUSION THERAPY | Age: 60
End: 2025-01-15

## 2025-01-15 DIAGNOSIS — Z17.0 MALIGNANT NEOPLASM OF OVERLAPPING SITES OF LEFT BREAST IN FEMALE, ESTROGEN RECEPTOR POSITIVE (HCC): Primary | ICD-10-CM

## 2025-01-15 DIAGNOSIS — C50.812 MALIGNANT NEOPLASM OF OVERLAPPING SITES OF LEFT BREAST IN FEMALE, ESTROGEN RECEPTOR POSITIVE (HCC): Primary | ICD-10-CM

## 2025-01-22 DIAGNOSIS — Z85.3 PERSONAL HISTORY OF BREAST CANCER: Primary | ICD-10-CM

## 2025-03-10 ENCOUNTER — NURSE TRIAGE (OUTPATIENT)
Dept: CALL CENTER | Facility: HOSPITAL | Age: 60
End: 2025-03-10
Payer: COMMERCIAL

## 2025-03-11 NOTE — TELEPHONE ENCOUNTER
Reason for Disposition   [1] DOUBLE DOSE (an extra dose or lesser amount) of prescription drug AND [2] any symptoms (e.g., dizziness, nausea, pain, sleepiness)    Additional Information   Negative: [1] Caller is not with the adult (patient) AND [2] reporting urgent symptoms   Negative: Lab result questions   Medication questions   Negative: [1] Intentional drug overdose AND [2] suicidal thoughts or ideas   Negative: Drug overdose and triager unable to answer question   Negative: Caller requesting a renewal or refill of a medicine patient is currently taking   Negative: Caller requesting information unrelated to medicine   Negative: Caller requesting information about COVID-19 Vaccine   Negative: Caller requesting information about Emergency Contraception   Negative: Caller requesting information about Combined Birth Control Pills   Negative: Caller requesting information about Progestin Birth Control Pills   Negative: Caller requesting information about Post-Op pain or medicines   Negative: Caller requesting a prescription antibiotic (such as Penicillin) for Strep throat and has a positive culture result   Negative: Caller requesting a prescription anti-viral med (such as Tamiflu) and has influenza (flu) symptoms   Negative: Immunization reaction suspected   Negative: Rash while taking a medicine or within 3 days of stopping it   Negative: [1] Asthma and [2] having symptoms of asthma (cough, wheezing, etc.)   Negative: [1] Symptom of illness (e.g., headache, abdominal pain, earache, vomiting) AND [2] more than mild   Negative: Breastfeeding questions about mother's medicines and diet   Negative: MORE THAN A DOUBLE DOSE of a prescription or over-the-counter (OTC) drug   Negative: [1] DOUBLE DOSE (an extra dose or lesser amount) of over-the-counter (OTC) drug AND [2] any symptoms (e.g., dizziness, nausea, pain, sleepiness)   Negative: Took another person's prescription drug    Answer Assessment - Initial Assessment  "Questions  1. REASON FOR CALL or QUESTION: \"What is your reason for calling today?\" or \"How can I best help you?\" or \"What question do you have that I can help answer?\"      May have taken 2 Metoprolol, only suppose to take one    Answer Assessment - Initial Assessment Questions  1. NAME of MEDICINE: \"What medicine(s) are you calling about?\"      Metoprolol  2. QUESTION: \"What is your question?\" (e.g., double dose of medicine, side effect)      Prescribed one a day and may have taken 2  3. PRESCRIBER: \"Who prescribed the medicine?\" Reason: if prescribed by specialist, call should be referred to that group.      PCP  4. SYMPTOMS: \"Do you have any symptoms?\" If Yes, ask: \"What symptoms are you having?\"  \"How bad are the symptoms (e.g., mild, moderate, severe)      none  5. PREGNANCY:  \"Is there any chance that you are pregnant?\" \"When was your last menstrual period?\"      NA    Protocols used: Information Only Call - No Triage-ADULT-, Medication Question Call-ADULT-    "

## 2025-07-03 ENCOUNTER — OFFICE VISIT (OUTPATIENT)
Dept: SURGERY | Age: 60
End: 2025-07-03
Payer: COMMERCIAL

## 2025-07-03 VITALS — BODY MASS INDEX: 33.13 KG/M2 | WEIGHT: 180 LBS | HEIGHT: 62 IN | OXYGEN SATURATION: 98 % | HEART RATE: 88 BPM

## 2025-07-03 DIAGNOSIS — Z98.890 STATUS POST LEFT BREAST LUMPECTOMY: Primary | ICD-10-CM

## 2025-07-03 PROCEDURE — 99213 OFFICE O/P EST LOW 20 MIN: CPT | Performed by: PHYSICIAN ASSISTANT

## 2025-07-03 RX ORDER — ROSUVASTATIN CALCIUM 20 MG/1
TABLET, COATED ORAL
COMMUNITY
Start: 2025-04-27

## 2025-07-03 NOTE — PROGRESS NOTES
mis-transcribed.)  Over 50% of the total visit time of 25 minutes in face to face encounter with the patient, out of which more than 50% of the time was spent in counseling patient or family and coordination of care. Counseling included but was not limited to time spent reviewing labs, imaging studies/ treatment plan and answering questions.

## 2025-08-13 ENCOUNTER — NURSE TRIAGE (OUTPATIENT)
Dept: CALL CENTER | Facility: HOSPITAL | Age: 60
End: 2025-08-13
Payer: COMMERCIAL

## 2025-08-27 ENCOUNTER — OFFICE VISIT (OUTPATIENT)
Dept: OBSTETRICS AND GYNECOLOGY | Age: 60
End: 2025-08-27
Payer: COMMERCIAL

## 2025-08-27 VITALS
BODY MASS INDEX: 32.57 KG/M2 | DIASTOLIC BLOOD PRESSURE: 72 MMHG | HEIGHT: 62 IN | WEIGHT: 177 LBS | SYSTOLIC BLOOD PRESSURE: 128 MMHG

## 2025-08-27 DIAGNOSIS — Z01.419 WELL WOMAN EXAM WITH ROUTINE GYNECOLOGICAL EXAM: Primary | ICD-10-CM

## 2025-08-27 DIAGNOSIS — Z85.3 HISTORY OF BREAST CANCER: ICD-10-CM

## 2025-08-27 DIAGNOSIS — M85.80 OSTEOPENIA, UNSPECIFIED LOCATION: ICD-10-CM

## 2025-08-27 DIAGNOSIS — N89.8 VAGINAL DRYNESS: ICD-10-CM

## 2025-08-27 PROCEDURE — 99396 PREV VISIT EST AGE 40-64: CPT | Performed by: NURSE PRACTITIONER

## 2025-08-27 PROCEDURE — 99459 PELVIC EXAMINATION: CPT | Performed by: NURSE PRACTITIONER

## 2025-08-27 RX ORDER — ROSUVASTATIN CALCIUM 20 MG/1
TABLET, COATED ORAL
COMMUNITY
Start: 2025-04-27

## 2025-08-27 RX ORDER — LISINOPRIL 20 MG/1
20 TABLET ORAL DAILY
COMMUNITY

## 2025-08-27 RX ORDER — HYDROCHLOROTHIAZIDE 25 MG/1
12.5 TABLET ORAL 2 TIMES DAILY
COMMUNITY

## 2025-08-27 RX ORDER — METOPROLOL SUCCINATE 25 MG/1
25 TABLET, EXTENDED RELEASE ORAL 2 TIMES DAILY
COMMUNITY
Start: 2025-03-24